# Patient Record
Sex: FEMALE | Race: AMERICAN INDIAN OR ALASKA NATIVE | HISPANIC OR LATINO
[De-identification: names, ages, dates, MRNs, and addresses within clinical notes are randomized per-mention and may not be internally consistent; named-entity substitution may affect disease eponyms.]

---

## 2023-02-24 ENCOUNTER — RX ONLY (OUTPATIENT)
Age: 33
Setting detail: RX ONLY
End: 2023-02-24

## 2023-04-24 ENCOUNTER — RX ONLY (OUTPATIENT)
Age: 33
Setting detail: RX ONLY
End: 2023-04-24

## 2024-01-24 ENCOUNTER — APPOINTMENT (RX ONLY)
Dept: URBAN - METROPOLITAN AREA CLINIC 184 | Facility: CLINIC | Age: 34
Setting detail: DERMATOLOGY
End: 2024-01-24

## 2024-01-24 DIAGNOSIS — L81.8 OTHER SPECIFIED DISORDERS OF PIGMENTATION: ICD-10-CM

## 2024-01-24 PROCEDURE — 99202 OFFICE O/P NEW SF 15 MIN: CPT

## 2024-01-24 PROCEDURE — ? COUNSELING

## 2024-01-24 ASSESSMENT — LOCATION SIMPLE DESCRIPTION DERM: LOCATION SIMPLE: VULVA

## 2024-01-24 ASSESSMENT — LOCATION ZONE DERM: LOCATION ZONE: VULVA

## 2024-01-24 ASSESSMENT — LOCATION DETAILED DESCRIPTION DERM: LOCATION DETAILED: RIGHT LABIA MINORA

## 2024-01-24 NOTE — HPI: SKIN LESION
How Severe Is Your Skin Lesion?: mild
Has Your Skin Lesion Been Treated?: not been treated
Is This A New Presentation, Or A Follow-Up?: Mole
Additional History: Examined by PCP and GYN. Referred to derm.

## 2024-04-09 NOTE — PROCEDURE: REASSURANCE
4/9/2024         RE: Kandis Katz  [address]  [place] [address]        Dear Colleague,    Thank you for referring your patient, Kandis aKtz, to the [facility]. Please see a copy of my visit note below.    Chief Complaint   Patient presents with     Ent Problem     Facial swelling, eye drainage. HX: Chronic maxillary sinusitis.       She developed left eye swelling overnight.  With associated significant left facial swelling and eye drainage.  History of prior preseptal cellulitis of left eye and history of preseptal cellulitis on right eye several months prior.  Associated purulent eye drainage  Denies eye movement restriction.    No fevers, chills  Eating and drinking well.     She has a longstanding history of chronic maxillary sinusitis  Distant history of nasal fracture playing basketball     15 pack yr tobacco use      Past Medical History:   Diagnosis Date     Chest skin lesion 06/10/2021    incision and drainage of right chest lesion     HTN (hypertension) 03/08/2011     Infertility associated with anovulation      Migraine      Polycystic ovarian syndrome      PONV (postoperative nausea and vomiting)      Psoriasis     with psoriatic arthritis     STD (sexually transmitted disease)         Allergies   Allergen Reactions     Lisinopril Cough     Seafood Swelling     Levaquin [Levofloxacin] Cramps     Current Outpatient Medications   Medication Sig Dispense Refill     acebutolol (SECTRAL) 200 MG capsule Take 1 capsule (200 mg) by mouth 2 times daily 180 capsule 3     budesonide (PULMICORT) 0.5 MG/2ML neb solution Squirt entire vial into zandra med saline solution, mix, and irrigate each nostril until entire bottle empty.  Do this twice daily. 200 mL 11     EPINEPHrine (ANY BX GENERIC EQUIV) 0.3 MG/0.3ML injection 2-pack Inject 0.3 mLs (0.3 mg) into the muscle as needed for anaphylaxis 2 each 1     hydrochlorothiazide (HYDRODIURIL) 25 MG tablet Take 1 tablet (25 mg) by mouth 
"daily 90 tablet 3     HYDROcodone-acetaminophen (NORCO) 5-325 MG tablet Take 1 tablet by mouth every 4 hours as needed for moderate to severe pain 15 tablet 0     ibuprofen (ADVIL/MOTRIN) 200 MG capsule Take 200 mg by mouth every 4 hours as needed.       lactobacillus rhamnosus, GG, (CULTURELL) capsule Take 1 capsule by mouth 2 times daily       losartan (COZAAR) 50 MG tablet Take 1 tablet (50 mg) by mouth daily 90 tablet 3     omeprazole (PRILOSEC) 20 MG DR capsule Take 1 capsule (20 mg) by mouth 2 times daily 180 capsule 2     ondansetron (ZOFRAN ODT) 4 MG ODT tab Take 1 tablet (4 mg) by mouth every 8 hours as needed for nausea 4 tablet 0     triamcinolone (KENALOG) 0.1 % external ointment Apply topically 2 times daily Avoid using more than 2 weeks 30 g 0     valACYclovir (VALTREX) 500 MG tablet Take 1 tablet (500 mg) by mouth daily 90 tablet 3     No current facility-administered medications for this visit.     ROS- SEE HPI  /78 (BP Location: Left arm, Cuff Size: Adult Regular)   Pulse 80   Temp 99  F (37.2  C) (Tympanic)   Ht 1.727 m (5' 8\")   Wt 69.4 kg (153 lb)   LMP 02/14/2015   SpO2 98%   BMI 23.26 kg/m      General - The patient is well nourished and well developed, and appears to have good nutritional status.  Alert and oriented to person and place, answers questions and cooperates with examination appropriately.   Head and Face - Normocephalic and atraumatic, with no gross asymmetry noted.  The facial nerve is intact, with strong symmetric movements.  Grade 1 out of 6 bilaterally   mild left periorbital erythema, edema, maxillary erythema.  Mild tenderness to palpation of the left maxillary sinus and left temple.  Voice and Breathing - The patient was breathing comfortably without the use of accessory muscles. There was no wheezing, stridor, or stertor.  The patients voice was clear and strong, and had appropriate pitch and quality.  No zainab peripheral digital clubbing or cyanosis   Ears "
-The external auditory canals are patent, the tympanic membranes are intact without effusion, retraction or mass.  Bony landmarks are intact.  Eyes - Extraocular movements intact, and the pupils were reactive to light.  Sclera were not icteric or injected, conjunctiva were pink and moist. No entrapment.   Mouth - Examination of the oral cavity showed pink, healthy oral mucosa. No lesions or ulcerations noted.  The tongue was mobile and midline, and the dentition were in good condition.    Throat - The walls of the oropharynx were smooth, pink, moist, symmetric, and had no lesions or ulcerations.  The tonsillar pillars and soft palate were symmetric.  The uvula was midline on elevation.    Neck - No palpable enlarged fixed cervical lymph nodes.  No neck cysts or unusual tenderness to palpation.   No palpable fixed thyroid nodules or concerning goiter.  The trachea is grossly midline.   Nose - External contour is symmetric, no gross deflection or scars.  Nasal mucosa is pink and moist with no abnormal mucus.  The septum and turbinates were evaluated.  No polyps, masses, or purulence noted on examination.  Declined nasal endoscopy.       ASSESSMENT/ PLAN:      ICD-10-CM    1. Preseptal cellulitis of left eye  L03.213 cefdinir (OMNICEF) 300 MG capsule      2. Chronic maxillary sinusitis  J32.0 cefdinir (OMNICEF) 300 MG capsule      3. DNS (deviated nasal septum)  J34.2           Start Cefdinir twice a day for 10 days     Use Budesonide Rinses Twice Daily    Return to ENT if worsening or new symptoms develop  She may consider surgical options in future with Dr. Cho.     Brenda Douglass PA-C  ENT  [facility], [place]      Again, thank you for allowing me to participate in the care of your patient.        Sincerely,        Brenda Douglass PA-C  
Hide Include Location In Plan Question?: No
Detail Level: Simple
Additional Note: Reassured on benign nature. Recommended to monitor monthly. Will reevaluate annually.